# Patient Record
Sex: FEMALE | Race: WHITE
[De-identification: names, ages, dates, MRNs, and addresses within clinical notes are randomized per-mention and may not be internally consistent; named-entity substitution may affect disease eponyms.]

---

## 2020-09-24 ENCOUNTER — HOSPITAL ENCOUNTER (OUTPATIENT)
Dept: HOSPITAL 75 - RAD FS | Age: 60
End: 2020-09-24
Attending: NURSE PRACTITIONER
Payer: MEDICARE

## 2020-09-24 DIAGNOSIS — K63.89: Primary | ICD-10-CM

## 2020-09-24 DIAGNOSIS — Z85.43: ICD-10-CM

## 2020-09-24 PROCEDURE — 74177 CT ABD & PELVIS W/CONTRAST: CPT

## 2020-09-24 NOTE — DIAGNOSTIC IMAGING REPORT
PROCEDURE: CT abdomen and pelvis with contrast.



TECHNIQUE: Multiple contiguous axial images were obtained through

the abdomen and pelvis after administration of intravenous

contrast. Auto Exposure Controls were utilized during the CT exam

to meet ALARA standards for radiation dose reduction. 



INDICATION: Lower abdominal pain, history of ovarian carcinoma.



COMPARISON: There are no prior studies available for comparison.



There is a lobulated 3.4 x 4.8 cm solid mass along the posterior

aspect of the rectosigmoid junction. Along the lateral margin of

this mass, on the right, there is a contiguous 1.5 x 2.1 cm low

density area which may represent a cyst. This mass is of

uncertain etiology but should be considered neoplastic until

proven otherwise. Whether this is related to the patient's

diagnosis of ovarian carcinoma or whether this is arising from

the rectosigmoid portion of the colon is unclear. Endoscopy would

be recommended for further evaluation. If further imaging is

desired, then a PET/CT exam should be obtained.



The uterus and ovaries appear to be surgically absent. The

urinary bladder is grossly unremarkable. There does appear to be

a fair amount of fecal material throughout the colon.



The appendix was not well-visualized but there are no indirect

signs of acute appendicitis.



There is a lobulated 1.4 cm area of low density in the left lobe

of the liver. This is most likely a cyst. There are also a few

subcentimeter areas of low density in the left and right lobes.

These too are probably cysts. If further study is desired,

ultrasound would be recommended. The liver is otherwise

homogeneous and not enlarged. The spleen, pancreas, gallbladder,

adrenals, kidneys, aorta, portal vein and inferior vena cava show

no sign of an acute abnormality. The stomach is not well

distended and difficult to assess.



The lung bases are clear.



The bone windows show no evidence for a fracture or for a

destructive lesion.



There are bilateral breast implants in place. The implants, where

visualized, appear to be intact.



IMPRESSION:

1. There is a lobulated complex predominantly solid mass along

the posterior margin of the rectosigmoid junction. This finding

is worrisome for neoplasm. Recommendations as above.

2. There is no acute abnormality of the abdomen or pelvis noted

otherwise.

3. There is a considerable amount of fecal material throughout

the colon. 



Dictated by: 



  Dictated on workstation # [id number]

## 2021-02-03 ENCOUNTER — HOSPITAL ENCOUNTER (EMERGENCY)
Dept: HOSPITAL 75 - ER FS | Age: 61
Discharge: ANOTHER HOSPITAL | End: 2021-02-03
Payer: MEDICARE

## 2021-02-03 VITALS — DIASTOLIC BLOOD PRESSURE: 70 MMHG | SYSTOLIC BLOOD PRESSURE: 129 MMHG

## 2021-02-03 VITALS — WEIGHT: 179.9 LBS | BODY MASS INDEX: 31.88 KG/M2 | HEIGHT: 62.99 IN

## 2021-02-03 DIAGNOSIS — Z85.3: ICD-10-CM

## 2021-02-03 DIAGNOSIS — R19.7: Primary | ICD-10-CM

## 2021-02-03 DIAGNOSIS — C56.9: ICD-10-CM

## 2021-02-03 DIAGNOSIS — Z87.891: ICD-10-CM

## 2021-02-03 DIAGNOSIS — K56.699: ICD-10-CM

## 2021-02-03 LAB
ALBUMIN SERPL-MCNC: 4 GM/DL (ref 3.2–4.5)
ALP SERPL-CCNC: 96 U/L (ref 40–136)
ALT SERPL-CCNC: 9 U/L (ref 0–55)
APTT PPP: YELLOW S
BACTERIA #/AREA URNS HPF: (no result) /HPF
BASOPHILS # BLD AUTO: 0 10^3/UL (ref 0–0.1)
BASOPHILS NFR BLD AUTO: 0 % (ref 0–10)
BASOPHILS NFR BLD MANUAL: 1 %
BILIRUB SERPL-MCNC: 0.9 MG/DL (ref 0.1–1)
BILIRUB UR QL STRIP: NEGATIVE
BUN/CREAT SERPL: 21
CALCIUM SERPL-MCNC: 8.8 MG/DL (ref 8.5–10.1)
CHLORIDE SERPL-SCNC: 99 MMOL/L (ref 98–107)
CO2 SERPL-SCNC: 25 MMOL/L (ref 21–32)
CREAT SERPL-MCNC: 0.71 MG/DL (ref 0.6–1.3)
EOSINOPHIL # BLD AUTO: 0 10^3/UL (ref 0–0.3)
EOSINOPHIL NFR BLD AUTO: 0 % (ref 0–10)
EOSINOPHIL NFR BLD MANUAL: 0 %
FIBRINOGEN PPP-MCNC: CLEAR MG/DL
GFR SERPLBLD BASED ON 1.73 SQ M-ARVRAT: > 60 ML/MIN
GLUCOSE SERPL-MCNC: 118 MG/DL (ref 70–105)
GLUCOSE UR STRIP-MCNC: NEGATIVE MG/DL
HCT VFR BLD CALC: 35 % (ref 35–52)
HGB BLD-MCNC: 11.9 G/DL (ref 11.5–16)
KETONES UR QL STRIP: (no result)
LEUKOCYTE ESTERASE UR QL STRIP: NEGATIVE
LIPASE SERPL-CCNC: 10 U/L (ref 8–78)
LYMPHOCYTES # BLD AUTO: 0.4 X 10^3 (ref 1–4)
LYMPHOCYTES NFR BLD AUTO: 3 % (ref 12–44)
MCH RBC QN AUTO: 31 PG (ref 25–34)
MCHC RBC AUTO-ENTMCNC: 35 G/DL (ref 32–36)
MCV RBC AUTO: 89 FL (ref 80–99)
MONOCYTES # BLD AUTO: 0.2 X 10^3 (ref 0–1)
MONOCYTES NFR BLD AUTO: 1 % (ref 0–12)
MONOCYTES NFR BLD: 2 %
NEUTROPHILS # BLD AUTO: 12 X 10^3 (ref 1.8–7.8)
NEUTROPHILS NFR BLD AUTO: 95 % (ref 42–75)
NEUTS BAND NFR BLD MANUAL: 93 %
NEUTS BAND NFR BLD: 1 %
NITRITE UR QL STRIP: NEGATIVE
PH UR STRIP: 8 [PH] (ref 5–9)
PLATELET # BLD: 280 10^3/UL (ref 130–400)
PMV BLD AUTO: 9.4 FL (ref 7.4–10.4)
POTASSIUM SERPL-SCNC: 3.5 MMOL/L (ref 3.6–5)
PROT SERPL-MCNC: 6.6 GM/DL (ref 6.4–8.2)
PROT UR QL STRIP: NEGATIVE
RBC #/AREA URNS HPF: (no result) /HPF
SODIUM SERPL-SCNC: 137 MMOL/L (ref 135–145)
SP GR UR STRIP: 1.01 (ref 1.02–1.02)
SQUAMOUS #/AREA URNS HPF: (no result) /HPF
VARIANT LYMPHS NFR BLD MANUAL: 3 %
WBC # BLD AUTO: 12.7 10^3/UL (ref 4.3–11)
WBC #/AREA URNS HPF: (no result) /HPF

## 2021-02-03 PROCEDURE — 83605 ASSAY OF LACTIC ACID: CPT

## 2021-02-03 PROCEDURE — 74022 RADEX COMPL AQT ABD SERIES: CPT

## 2021-02-03 PROCEDURE — 80053 COMPREHEN METABOLIC PANEL: CPT

## 2021-02-03 PROCEDURE — 85027 COMPLETE CBC AUTOMATED: CPT

## 2021-02-03 PROCEDURE — 36415 COLL VENOUS BLD VENIPUNCTURE: CPT

## 2021-02-03 PROCEDURE — 85007 BL SMEAR W/DIFF WBC COUNT: CPT

## 2021-02-03 PROCEDURE — 81000 URINALYSIS NONAUTO W/SCOPE: CPT

## 2021-02-03 PROCEDURE — 83690 ASSAY OF LIPASE: CPT

## 2021-02-03 PROCEDURE — 84484 ASSAY OF TROPONIN QUANT: CPT

## 2021-02-03 RX ADMIN — SODIUM CHLORIDE SCH MLS/HR: 900 INJECTION, SOLUTION INTRAVENOUS at 20:10

## 2021-02-03 RX ADMIN — SODIUM CHLORIDE SCH MLS/HR: 900 INJECTION, SOLUTION INTRAVENOUS at 21:15

## 2021-02-03 NOTE — DIAGNOSTIC IMAGING REPORT
PATIENT HISTORY: Nausea and vomiting. Abdominal pain. History of

ovarian cancer.



TECHNIQUE: Three views of the chest and abdomen were obtained.



COMPARISON: 09/24/2020.



FINDINGS: A right port is visualized, the tip overlying the mid

SVC.



The lung volumes are normal. No focal consolidation is seen. No

large pleural effusion or pneumothorax is seen. The

cardiomediastinal silhouette is normal in size and contour. No

acute osseous abnormality is seen. Scattered calcified granulomas

are noted in the lungs, bilaterally.



Distended loops of small bowel are seen in the left hemiabdomen

measuring up to 4.8 cm. A large amount of stool is seen in the

colon. No large collection of intraperitoneal free air. Surgical

clips are seen in the pelvis right of midline.



IMPRESSION:

1. Distended loops of small bowel in the left hemiabdomen,

concerning for early small bowel obstruction given the large

amounts of stool in the colon. No large collection of free

intraperitoneal air. Recommend follow-up, as indicated.

2. No focal consolidation or pulmonary mass. 



Dictated by: 



  Dictated on workstation # [id number]

## 2021-02-03 NOTE — ED GENERAL
General


Chief Complaint:  Abdominal/GI Problems


Stated Complaint:  NAUSEA


Nursing Triage Note:  


PT TO ROOM FS01 VIA [facility] WITH C/O N/V/ABD PAIN. PT REPORTS OVARIAN CANCER AND HAD




A TX ON MONDAY. PT STATES THAT SHE IS NORMALLY NAUSEATED FOR A COUPLE DAYS AND 


TODAY IT IS WORSE THAN NORMAL AND HER ABD IS CRAMPING. PT GIVEN ZOFRAN AND 


100MCG FENT ENROUTE BY EMS.


Nursing Sepsis Screen:  No Definite Risk


Source of Information:  Patient





History of Present Illness


Date Seen by Provider:  Feb 3, 2021


Time Seen by Provider:  19:30


Initial Comments


Patient is a 60-year-old female with history of ovarian cancer currently on 

chemotherapy and received most recent round 2 days ago.  Patient reports diffuse

lower abdominal cramping, diarrhea nausea and vomiting.  Symptoms are different 

than previous reactions to chemotherapy which normally resulted in fatigue.  

Patient denies fever chills, sweats.  No chest pain palpitations, shortness of 

breath.  No hematemesis coffee-ground emesis.  Patient's oncologist practices at

[facility].


Timing/Duration:  1-2 Days


Severity:  Moderate


Associated Systoms:  Weakness, Other





Allergies and Home Medications


Allergies


Coded Allergies:  


     No Known Drug Allergies (Unverified , 9/24/20)





Patient Home Medication List


Home Medication List Reviewed:  Yes





Review of Systems


Review of Systems


Constitutional:  see HPI


EENTM:  see HPI


Respiratory:  see HPI


Cardiovascular:  see HPI


Gastrointestinal:  see HPI


Genitourinary:  see HPI


Musculoskeletal:  see HPI


Psychiatric/Neurological:  See HPI


Hematologic/Lymphatic:  See HPI


Immunological/Allergic:  see HPI





All Other Systems Reviewed


Negative Unless Noted:  Yes





Past Medical-Social-Family Hx


Past Med/Social Hx:  Reviewed Nursing Past Med/Soc Hx


Patient Social History


Alcohol Use:  Denies Use


Smoking Status:  Former Smoker


Type Used:  Electronic/Vapor


2nd Hand Smoke Exposure:  No


Recent Infectious Disease Expo:  No


Recent Hopitalizations:  No





Seasonal Allergies


Seasonal Allergies:  No





Past Medical History


Surgeries:  Yes


Breast, Gallbladder, Hysterectomy


Respiratory:  No


Cardiac:  No


Neurological:  No


Genitourinary:  No


Gastrointestinal:  No


Musculoskeletal:  No


Endocrine:  Yes


Hypothyroidsim


HEENT:  No


Cancer:  Yes


Breast, Ovarian


What Type of Treatment Did You:  Chemotherapy, Surgical Intervention


Psychosocial:  No


Integumentary:  No


Blood Disorders:  No





Physical Exam


Vital Signs





Vital Signs - First Documented








 2/3/21





 19:35


 


Temp 36.4


 


Pulse 66


 


Resp 17


 


B/P (MAP) 167/89 (115)


 


O2 Delivery Room Air





Capillary Refill : Less Than 3 Seconds


Height, Weight, BMI


Height: '"


Weight: lbs. oz. kg; 31.00 BMI


Method:


General Appearance:  No Apparent Distress


Eyes:  Bilateral Eye Normal Inspection, Bilateral Eye PERRL, Bilateral Eye EOMI


HEENT:  PERRL/EOMI, Normal ENT Inspection, Pharynx Normal


Neck:  Full Range of Motion, Non Tender, Supple


Respiratory:  Lungs Clear


Cardiovascular:  Regular Rate, Rhythm


Gastrointestinal:  Soft, Distended (No rebound rigidity or guarding.), Other


Back:  Normal Inspection, No CVA Tenderness


Neurologic/Psychiatric:  Alert, Oriented x3, No Motor/Sensory Deficits


Skin:  Normal Color


Lymphatic:  No Adenopathy





Focused Exam


Sepsis Stage:  Ruled Out


Lactate Level


2/3/21 20:05: Lactic Acid Level 2.15*H





Lactic Acid Level





Laboratory Tests








Test


 2/3/21


20:05


 


Lactic Acid Level


 2.15 MMOL/L


(0.50-2.00)  *H











Progress/Results/Core Measures


Suspected Sepsis


Recent Fever Within 48 Hours:  No


Infection Criteria Present:  None


New/Unexplained  Altered Menta:  No


Sepsis Screen:  No Definite Risk


SIRS


Temperature: 


Pulse: 66 


Respiratory Rate: 17


 


Laboratory Tests


2/3/21 19:39: White Blood Count 12.7H


Blood Pressure 167 /89 


Mean: 115


 


2/3/21 20:05: Lactic Acid Level 2.15*H


Laboratory Tests


2/3/21 19:39: 


Creatinine 0.71, Platelet Count 280, Total Bilirubin 0.9








Results/Orders


Lab Results





Laboratory Tests








Test


 2/3/21


19:39 2/3/21


20:05 2/3/21


20:20 Range/Units


 


 


White Blood Count


 12.7 H


 


 


 4.3-11.0


10^3/uL


 


Red Blood Count


 3.87 L


 


 


 4.35-5.85


10^6/uL


 


Hemoglobin 11.9    11.5-16.0  G/DL


 


Hematocrit 35    35-52  %


 


Mean Corpuscular Volume 89    80-99  FL


 


Mean Corpuscular Hemoglobin 31    25-34  PG


 


Mean Corpuscular Hemoglobin


Concent 35 


 


 


 32-36  G/DL





 


Red Cell Distribution Width 16.9 H   10.0-14.5  %


 


Platelet Count


 280 


 


 


 130-400


10^3/uL


 


Mean Platelet Volume 9.4    7.4-10.4  FL


 


Immature Granulocyte % (Auto) 1     %


 


Neutrophils (%) (Auto) 95 H   42-75  %


 


Lymphocytes (%) (Auto) 3 L   12-44  %


 


Monocytes (%) (Auto) 1    0-12  %


 


Eosinophils (%) (Auto) 0    0-10  %


 


Basophils (%) (Auto) 0    0-10  %


 


Neutrophils # (Auto) 12.0 H   1.8-7.8  X 10^3


 


Lymphocytes # (Auto) 0.4 L   1.0-4.0  X 10^3


 


Monocytes # (Auto) 0.2    0.0-1.0  X 10^3


 


Eosinophils # (Auto)


 0.0 


 


 


 0.0-0.3


10^3/uL


 


Basophils # (Auto)


 0.0 


 


 


 0.0-0.1


10^3/uL


 


Immature Granulocyte # (Auto)


 0.1 


 


 


 0.0-0.1


10^3/uL


 


Neutrophils % (Manual) 93     %


 


Lymphocytes % (Manual) 3     %


 


Monocytes % (Manual) 2     %


 


Eosinophils % (Manual) 0     %


 


Basophils % (Manual) 1     %


 


Band Neutrophils 1     %


 


Sodium Level 137    135-145  MMOL/L


 


Potassium Level 3.5 L   3.6-5.0  MMOL/L


 


Chloride Level 99      MMOL/L


 


Carbon Dioxide Level 25    21-32  MMOL/L


 


Anion Gap 13    5-14  MMOL/L


 


Blood Urea Nitrogen 15    7-18  MG/DL


 


Creatinine


 0.71 


 


 


 0.60-1.30


MG/DL


 


Estimat Glomerular Filtration


Rate > 60 


 


 


  





 


BUN/Creatinine Ratio 21     


 


Glucose Level 118 H     MG/DL


 


Calcium Level 8.8    8.5-10.1  MG/DL


 


Corrected Calcium 8.8    8.5-10.1  MG/DL


 


Total Bilirubin 0.9    0.1-1.0  MG/DL


 


Aspartate Amino Transf


(AST/SGOT) 15 


 


 


 5-34  U/L





 


Alanine Aminotransferase


(ALT/SGPT) 9 


 


 


 0-55  U/L





 


Alkaline Phosphatase 96      U/L


 


Troponin I < 0.30    <0.30  NG/ML


 


Total Protein 6.6    6.4-8.2  GM/DL


 


Albumin 4.0    3.2-4.5  GM/DL


 


Lipase 10    8-78  U/L


 


Lactic Acid Level


 


 2.15 *H


 


 0.50-2.00


MMOL/L


 


Urine Color   YELLOW   


 


Urine Clarity   CLEAR   


 


Urine pH   8.0  5-9  


 


Urine Specific Gravity   1.010 L 1.016-1.022  


 


Urine Protein   NEGATIVE  NEGATIVE  


 


Urine Glucose (UA)   NEGATIVE  NEGATIVE  


 


Urine Ketones   TRACE H NEGATIVE  


 


Urine Nitrite   NEGATIVE  NEGATIVE  


 


Urine Bilirubin   NEGATIVE  NEGATIVE  


 


Urine Urobilinogen   0.2  < = 1.0  MG/DL


 


Urine Leukocyte Esterase   NEGATIVE  NEGATIVE  


 


Urine RBC (Auto)   NEGATIVE  NEGATIVE  


 


Urine RBC   NONE   /HPF


 


Urine WBC   RARE   /HPF


 


Urine Squamous Epithelial


Cells 


 


 2-5 


  /HPF





 


Urine Crystals   NONE   /LPF


 


Urine Bacteria   TRACE   /HPF


 


Urine Casts   NONE   /LPF


 


Urine Mucus   NEGATIVE   /LPF


 


Urine Culture Indicated   NO   








My Orders





Orders - NATALIE WEST DO


Cbc And Manual Diff (2/3/21 19:45)


Comprehensive Metabolic Panel (2/3/21 19:45)


Lipase (2/3/21 19:45)


Acute Abd Series (2/3/21 19:45)


Urinalysis (2/3/21 19:45)


Troponin I Fs (2/3/21 19:45)


Ekg-Prn For Chest Pain Or Rhyt (2/3/21 19:45)


Ns Iv 1000 Ml (Sodium Chloride 0.9%) (2/3/21 19:45)


Famotidine Injection (Pepcid Injection) (2/3/21 19:45)


Prochlorperazine Injection (Compazine In (2/3/21 19:45)


Lactic Acid Analyzer (2/3/21 19:45)


Morphine  Injection (Morphine  Injection (2/3/21 21:09)





Medications Given in ED





Current Medications








 Medications  Dose


 Ordered  Sig/Ailyn


 Route  Start Time


 Stop Time Status Last Admin


Dose Admin


 


 Famotidine  20 mg  ONCE  ONCE


 IVP  2/3/21 19:45


 2/3/21 19:48 DC 2/3/21 20:11


20 MG


 


 Prochlorperazine


 Edisylate  10 mg  ONCE  ONCE


 IV  2/3/21 19:45


 2/3/21 19:48 DC 2/3/21 20:11


10 MG








Vital Signs/I&O











 2/3/21





 19:35


 


Temp 36.4


 


Pulse 66


 


Resp 17


 


B/P (MAP) 167/89 (115)


 


O2 Delivery Room Air





Capillary Refill : Less Than 3 Seconds








Blood Pressure Mean:                    115











Departure


Communication (Admissions)


Acute abdominal series: Dilated small bowel loops consistent with small bowel 

obstruction per radiologist





Patient with findings of small bowel obstruction.  Unfortunately due to 

technical reasons the CT scan is not available at this facility.  Patient 

accepted by Dr. Clancy at [facility].





Impression





   Primary Impression:  


   Diarrhea


   Additional Impressions:  


   Nausea alone


   Small bowel obstruction


   Ovarian cancer


Disposition:  09 ADMITTED AS INPATIENT


Condition:  Stable





Admissions


Decision to Admit Reason:  Admit from ER (General)





Transfer


Method of Transfer:  [facility]





Departure-Patient Inst.


Decision time for Depature:  21:00


Referrals:  


[facility] (PCP)


Primary Care Physician








GOLDEN TORRES (Family)


Primary Care Physician











NATALIE WEST DO                    Feb 3, 2021 21:40